# Patient Record
Sex: FEMALE
[De-identification: names, ages, dates, MRNs, and addresses within clinical notes are randomized per-mention and may not be internally consistent; named-entity substitution may affect disease eponyms.]

---

## 2017-05-04 PROBLEM — Z00.00 ENCOUNTER FOR PREVENTIVE HEALTH EXAMINATION: Status: ACTIVE | Noted: 2017-05-04

## 2017-05-19 ENCOUNTER — APPOINTMENT (OUTPATIENT)
Dept: DERMATOLOGY | Facility: CLINIC | Age: 27
End: 2017-05-19

## 2022-04-06 ENCOUNTER — APPOINTMENT (OUTPATIENT)
Dept: ORTHOPEDIC SURGERY | Facility: CLINIC | Age: 32
End: 2022-04-06

## 2022-12-22 ENCOUNTER — APPOINTMENT (OUTPATIENT)
Dept: OTOLARYNGOLOGY | Facility: CLINIC | Age: 32
End: 2022-12-22

## 2022-12-22 VITALS
TEMPERATURE: 97.7 F | HEIGHT: 62 IN | DIASTOLIC BLOOD PRESSURE: 73 MMHG | SYSTOLIC BLOOD PRESSURE: 113 MMHG | HEART RATE: 72 BPM | BODY MASS INDEX: 44.16 KG/M2 | WEIGHT: 240 LBS

## 2022-12-22 DIAGNOSIS — J34.2 DEVIATED NASAL SEPTUM: ICD-10-CM

## 2022-12-22 DIAGNOSIS — R49.0 DYSPHONIA: ICD-10-CM

## 2022-12-22 DIAGNOSIS — R09.82 POSTNASAL DRIP: ICD-10-CM

## 2022-12-22 PROCEDURE — 99204 OFFICE O/P NEW MOD 45 MIN: CPT | Mod: 25

## 2022-12-22 PROCEDURE — 31575 DIAGNOSTIC LARYNGOSCOPY: CPT

## 2022-12-22 RX ORDER — FLUTICASONE PROPIONATE 50 UG/1
50 SPRAY, METERED NASAL DAILY
Qty: 3 | Refills: 3 | Status: ACTIVE | COMMUNITY
Start: 2022-12-22 | End: 1900-01-01

## 2022-12-22 NOTE — END OF VISIT
[FreeTextEntry3] : I, Dr. Samuel personally performed the evaluation and management (E/M) services including all necessary procedures, for this new patient. That E/M includes conducting the clinically appropriate initial history &/or exam, assessing all conditions, and establishing the plan of care. Today, my KIANNA, Latanya Vega, was here to observe &/or participate in the visit & follow plan of care established by me.\par \par \par

## 2022-12-22 NOTE — ASSESSMENT
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 steroids at present but will consider if laryngitis were to recur.

## 2022-12-22 NOTE — HISTORY OF PRESENT ILLNESS
[de-identified] : Patient comes in with a chronic raspy voice. 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 \par This past summer she felt like swimming has made her voice raspy. \par She feels that when she first wakes up she has hoarseness and then by the end of the day its worse again. \par She does not have a history of acid reflux. \par She feels that she no longer can sing, either. \par At first she thought she had strep because there was pain associated with the throat, but now she doesn’t have pain unless she uses her voice a lot.

## 2022-12-22 NOTE — HISTORY OF PRESENT ILLNESS
[de-identified] : Patient comes in with a chronic raspy voice. 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 \par This past summer she felt like swimming has made her voice raspy. \par She feels that when she first wakes up she has hoarseness and then by the end of the day its worse again. \par She does not have a history of acid reflux. \par She feels that she no longer can sing, either. \par At first she thought she had strep because there was pain associated with the throat, but now she doesn’t have pain unless she uses her voice a lot.

## 2023-02-25 ENCOUNTER — NON-APPOINTMENT (OUTPATIENT)
Age: 33
End: 2023-02-25